# Patient Record
Sex: FEMALE | Employment: FULL TIME | URBAN - METROPOLITAN AREA
[De-identification: names, ages, dates, MRNs, and addresses within clinical notes are randomized per-mention and may not be internally consistent; named-entity substitution may affect disease eponyms.]

---

## 2021-05-28 ENCOUNTER — HOSPITAL ENCOUNTER (EMERGENCY)
Facility: CLINIC | Age: 21
Discharge: HOME | End: 2021-05-28
Attending: PHYSICIAN ASSISTANT | Admitting: PHYSICIAN ASSISTANT

## 2021-05-28 VITALS
DIASTOLIC BLOOD PRESSURE: 74 MMHG | TEMPERATURE: 98.2 F | HEART RATE: 67 BPM | WEIGHT: 197 LBS | RESPIRATION RATE: 16 BRPM | SYSTOLIC BLOOD PRESSURE: 114 MMHG | OXYGEN SATURATION: 93 %

## 2021-05-28 DIAGNOSIS — R30.0 DYSURIA: ICD-10-CM

## 2021-05-28 DIAGNOSIS — J02.9 SORE THROAT: ICD-10-CM

## 2021-05-28 LAB
ALBUMIN UR-MCNC: NEGATIVE MG/DL
APPEARANCE UR: CLEAR
BILIRUB UR QL STRIP: NEGATIVE
COLOR UR AUTO: ABNORMAL
DEPRECATED S PYO AG THROAT QL EIA: NEGATIVE
GLUCOSE UR STRIP-MCNC: NEGATIVE MG/DL
HGB UR QL STRIP: NEGATIVE
HYALINE CASTS #/AREA URNS LPF: 1 /LPF (ref 0–2)
KETONES UR STRIP-MCNC: NEGATIVE MG/DL
LEUKOCYTE ESTERASE UR QL STRIP: NEGATIVE
MUCOUS THREADS #/AREA URNS LPF: PRESENT /LPF
NITRATE UR QL: NEGATIVE
PH UR STRIP: 5.5 PH (ref 5–7)
RBC #/AREA URNS AUTO: 2 /HPF (ref 0–2)
SOURCE: ABNORMAL
SP GR UR STRIP: 1.01 (ref 1–1.03)
SPECIMEN SOURCE: NORMAL
SPECIMEN SOURCE: NORMAL
SQUAMOUS #/AREA URNS AUTO: 6 /HPF (ref 0–1)
STREP GROUP A PCR: NOT DETECTED
UROBILINOGEN UR STRIP-MCNC: NORMAL MG/DL (ref 0–2)
WBC #/AREA URNS AUTO: 1 /HPF (ref 0–5)

## 2021-05-28 PROCEDURE — 87651 STREP A DNA AMP PROBE: CPT | Performed by: PHYSICIAN ASSISTANT

## 2021-05-28 PROCEDURE — 81001 URINALYSIS AUTO W/SCOPE: CPT | Performed by: PHYSICIAN ASSISTANT

## 2021-05-28 PROCEDURE — 250N000012 HC RX MED GY IP 250 OP 636 PS 637: Performed by: PHYSICIAN ASSISTANT

## 2021-05-28 PROCEDURE — 999N001174 HC STATISTIC STREP A RAPID: Performed by: PHYSICIAN ASSISTANT

## 2021-05-28 PROCEDURE — 87086 URINE CULTURE/COLONY COUNT: CPT | Performed by: PHYSICIAN ASSISTANT

## 2021-05-28 PROCEDURE — 99283 EMERGENCY DEPT VISIT LOW MDM: CPT

## 2021-05-28 RX ADMIN — DEXAMETHASONE 10 MG: 2 TABLET ORAL at 15:11

## 2021-05-28 ASSESSMENT — ENCOUNTER SYMPTOMS
DYSURIA: 1
NAUSEA: 0
ABDOMINAL PAIN: 0
FEVER: 0
TROUBLE SWALLOWING: 0
HEMATURIA: 0
COUGH: 0
FREQUENCY: 1
SHORTNESS OF BREATH: 0
DIARRHEA: 0
SORE THROAT: 1
VOMITING: 0

## 2021-05-28 NOTE — ED TRIAGE NOTES
"Pt here with sore throat x3 days, \"prone to strep throat twice a year\". Also c/o dysuria x4 days. No cough, SOB, abd pain. ABC intact. A&Ox4.   "

## 2021-05-28 NOTE — DISCHARGE INSTRUCTIONS
Your urinalysis was negative for acute infection.  Urine culture pending.  As discussed, you may consider trying Pyridium over-the-counter.    Rapid strep test was negative.  PCR test pending.  Please review attached instructions for outpatient recommendations.  Follow-up with your primary care provider in 2 to 3 days if symptoms persist despite outpatient recommendations.  Return the emergency department if you develop fever, unable to handle oral secretions, increased discomfort, or any other medical concerns.

## 2021-05-28 NOTE — ED PROVIDER NOTES
History   Chief Complaint:  Sore Throat and Dysuria     HPI   Thierry Espinoza is a 21 year old female who presents to the ED for an evaluation of a sore throat and dysuria with increased frequency. The patient states the dysuria started 4 days ago while the sore throat started 3 days ago. Of note, she is prone to getting strep. Her last infection was around 5 months ago. She also has a history of UTI's and mentions that her urinary symptoms feel similar to those episodes. She otherwise is healthy and denies fever, difficulty swallowing, cough, shortness of breath, chest pain, nausea/emesis, abdominal pain, diarrhea, or hematuria. She is supposed to undergo a tonsillectomy but has not scheduled that yet as of today. No concern for pregnancy or STI.     Review of Systems   Constitutional: Negative for fever.   HENT: Positive for sore throat. Negative for trouble swallowing.    Respiratory: Negative for cough and shortness of breath.    Cardiovascular: Negative for chest pain.   Gastrointestinal: Negative for abdominal pain, diarrhea, nausea and vomiting.   Genitourinary: Positive for dysuria and frequency. Negative for hematuria.   All other systems reviewed and are negative.    Allergies:  The patient has no known allergies.     Medications:  The patient is not currently taking any prescribed medications.    Past Medical History:    Strep throat  UTI    Social History:  The patient presented with .     Physical Exam     Patient Vitals for the past 24 hrs:   BP Temp Temp src Pulse Resp SpO2 Weight   05/28/21 1515 114/74 -- -- 67 -- 93 % --   05/28/21 1314 113/63 98.2  F (36.8  C) Oral 77 16 97 % 89.4 kg (197 lb)       Physical Exam  Vitals signs and nursing note reviewed.   HENT:      Nose: Nose normal. No congestion or rhinorrhea.      Mouth/Throat:      Mouth: Mucous membranes are moist.      Pharynx: Oropharynx is clear. No oropharyngeal exudate or posterior oropharyngeal erythema. Uvula midline. No  adenopathy appreciated. Handling oral secretions without difficulty. Voice normal. Strep test negative.  Eyes:      General: No scleral icterus.     Extraocular Movements: Extraocular movements intact.      Conjunctiva/sclera: Conjunctivae normal.   Cardiovascular:      Rate and Rhythm: Regular rhythm. Normal Rate.     Pulses: Normal pulses.      Heart sounds: Normal heart sounds.   Pulmonary:      Effort: Pulmonary effort is normal.      Breath sounds: Normal breath sounds.   Abdominal:      General: Abdomen is flat. Bowel sounds are normal.      Palpations: Abdomen is soft.      Tenderness: There is no abdominal tenderness. No CVA tenderness.   Musculoskeletal: Normal range of motion. Observed ambulating in the ED without difficulty.   Skin:     General: Skin is warm and dry.   Neurological:      Mental Status: Alert. Speech normal. Responds appropriately to questions.   Psychiatric:         Mood and Affect: Mood normal.         Behavior: Behavior normal.     Emergency Department Course   Laboratory:   Urine Culture Aerobic Bacterial: Pending    Routine UA with micro: squamous epithelial/HPF 6 (H), mucous present (A), o/w negative    Streptococcus A Rapid Scr with Reflex to PCR: negative    Group A Streptococcus PCR Throat Swab: pending    Emergency Department Course:    Reviewed:  1320 I reviewed nursing notes, vitals and past medical history.    Assessments:  1324 I obtained history and examined the patient as noted above.   1345 I rechecked the patient and explained that we are still waiting for her lab to result.   1400    I rechecked the patient and explained the results of her labs.   1502 I rechecked the patient and informed her that her urine culture and strep PCR will be pending.    Interventions:  1511: Decadron 10 mg PO    Disposition:  The patient was discharged to home.     Impression & Plan   Medical Decision Making:  Thierry Espinoza is a 21 year old female who presents for evaluation of a sore  throat. Vitals were reviewed. Patient afebrile and hemodynamically stable.  Visible exam, the patient had a mild posterior oropharyngeal erythema.  No tonsillar hypertrophy.  No tonsillar exudates.  Uvula midline.  Patient handling oral secretions without difficulties.  Voice normal.  The rapid strep test is negative, and formal culture has been set up in the lab. There is no clinical evidence of peritonsillar abscess, retropharyngeal abscess, Lemierre's Syndrome, epiglottis, or Mandeep's angina.  No indication for antibiotic therapy at this time.  She was given a one-time dose of Decadron in the emergency department.  I have recommended treatment with analgesics, and we will await formal culture results. If the culture is positive, an ED physician will call the patient to initiate anti-microbial therapy. Return if increasing pain, change in voice, neck pain, vomiting, fever, or shortness of breath. Follow-up with primary physician if not improving in 3-5 days. Given well appearance, I would not test further for other etiologies of serious bacterial infections.     The patient also presents with dysuria.  UA was obtained which was negative for acute infection or hematuria.  The patient denied fever, back\flank pain, or significant abdominal tenderness.  At this time I have low clinical suspicion for appendicitis, colitis, diverticulitis, infected ureteral stone, or any intra-abdominal catastrophe given completely benign abdominal exam without rebound, guarding, distention, or marked tenderness to palpation.  UA results explained to the patient.  We discussed deferring antibiotic therapy at this time awaiting formal urine culture results.  The patient was ultimately discharged home in stable condition with recommendations for close follow-up with her primary care provider in the outpatient setting.  Strict return precautions were discussed.  All questions and concerns were addressed prior to discharge.  Above reviewed  with the patient who verbalized understanding and agreed with the plan.    Critical Care Time: none    Diagnosis:    ICD-10-CM    1. Sore throat  J02.9 Urine Culture     Group A Streptococcus PCR Throat Swab   2. Dysuria  R30.0        Discharge Medications:  There are no discharge medications for this patient.      Scribe Disclosure:  I, Aliya Storey, am serving as a scribe at 1:22 PM on 5/28/2021 to document services personally performed by Janine Hemphill PA-C based on my observations and the provider's statements to me.            Janine Hemphill PA-C  05/28/21 1755       Janine Hemphill PA-C  05/28/21 1911

## 2021-05-29 LAB
BACTERIA SPEC CULT: NORMAL
Lab: NORMAL
SPECIMEN SOURCE: NORMAL